# Patient Record
Sex: FEMALE | Race: WHITE | NOT HISPANIC OR LATINO | URBAN - METROPOLITAN AREA
[De-identification: names, ages, dates, MRNs, and addresses within clinical notes are randomized per-mention and may not be internally consistent; named-entity substitution may affect disease eponyms.]

---

## 2017-01-20 ENCOUNTER — IMPORTED ENCOUNTER (OUTPATIENT)
Dept: URBAN - METROPOLITAN AREA CLINIC 1 | Facility: CLINIC | Age: 57
End: 2017-01-20

## 2017-01-20 PROBLEM — H40.1131: Noted: 2017-01-20

## 2017-01-20 PROCEDURE — 99213 OFFICE O/P EST LOW 20 MIN: CPT

## 2017-01-20 PROCEDURE — 92133 CPTRZD OPH DX IMG PST SGM ON: CPT

## 2017-01-20 PROCEDURE — 92083 EXTENDED VISUAL FIELD XM: CPT

## 2017-01-20 NOTE — PATIENT DISCUSSION
1.  Mild Open Angle Glaucoma OU -Patient to start gtt regimen sample of Travatan Z QHS OU. Tmax was 22 OU. Target range 15-17 OU. IOP 19 OU. Patient advised to be compliant with gtts. Condition was discussed with patient and patient understands. Will continue to monitor patient for any progression in condition. Patient was advised to call us with any problems questions or concerns. OCT was ordered and done today showing minimal thinning OU. VF results were inferior arcuate OD and superior arcuate OS-early. 2.  Return for an appointment in 2-3 weeks for 10. with Dr. Nahid Esquivel.

## 2017-02-10 ENCOUNTER — IMPORTED ENCOUNTER (OUTPATIENT)
Dept: URBAN - METROPOLITAN AREA CLINIC 1 | Facility: CLINIC | Age: 57
End: 2017-02-10

## 2017-02-10 PROBLEM — H40.1131: Noted: 2017-02-10

## 2017-02-10 PROBLEM — H04.123: Noted: 2017-02-10

## 2017-02-10 PROCEDURE — 99213 OFFICE O/P EST LOW 20 MIN: CPT

## 2017-02-10 NOTE — PATIENT DISCUSSION
1.  Mild Open Angle Glaucoma OU-IOP was 17 OU. Patient to continue with current gtt regimen Erx Travatan Z QHS OU. Patient advised to be compliant with gtts. Condition was discussed with patient and patient understands. Will continue to monitor patient for any progression in condition. Patient was advised to call us with any problems questions or concerns. 2.  Dry Eyes OU -- Recommended to patient to use Artificial Tears BID OU3. Return for an appointment in 4 months for 10. with Dr. Tam Andujar.

## 2017-08-07 ENCOUNTER — IMPORTED ENCOUNTER (OUTPATIENT)
Dept: URBAN - METROPOLITAN AREA CLINIC 1 | Facility: CLINIC | Age: 57
End: 2017-08-07

## 2017-08-07 PROBLEM — H43.813: Noted: 2017-08-07

## 2017-08-07 PROBLEM — H04.123: Noted: 2017-08-07

## 2017-08-07 PROBLEM — H40.1131: Noted: 2017-08-07

## 2017-08-07 PROCEDURE — 99213 OFFICE O/P EST LOW 20 MIN: CPT

## 2017-08-07 NOTE — PATIENT DISCUSSION
1.  Mild Open Angle Glaucoma OU-IOP was 14 OU. Patient to continue with current gtt regimen Travatan Z QHS OU. Patient advised to be compliant with gtts. Condition was discussed with patient and patient understands. Will continue to monitor patient for any progression in condition. Patient was advised to call us with any problems questions or concerns. 2.  PVD w/o Tear OU - Patient was cautioned to call our office immediately if they experience   a substantial change in their symptoms such as an increase in floaters persistent flashes loss of visual field (may appear as a shadow or a curtain) or decrease in visual acuity as these may indicate a retinal tear or detachment. 3.  Dry Eyes OU -- Recommended to patient to use Artificial Tears BID OU4. Return for an appointment in January for 30 and OCT. with Dr. Alin Guerrero.

## 2018-01-15 ENCOUNTER — IMPORTED ENCOUNTER (OUTPATIENT)
Dept: URBAN - METROPOLITAN AREA CLINIC 1 | Facility: CLINIC | Age: 58
End: 2018-01-15

## 2018-01-15 PROBLEM — H52.13: Noted: 2018-01-15

## 2018-01-15 PROCEDURE — S0621 ROUTINE OPHTHALMOLOGICAL EXA: HCPCS

## 2018-01-15 NOTE — PATIENT DISCUSSION
1. Myopia OU- Rx for glasses given. 2. H/o Mild Open Angle Glaucoma OU (CD 0.8/0.75) Cont Latanoprost QHS OU. Pt should return for OCT next visit. 3. PVD w/o Tear OU - RD precautions. 4. Dry Eyes OU -- Cont ATs BID OU Routienly. 5. H/o DM w/o DR OU Return for an appointment in 3-4 mo 30 OCT glare with Dr. Chelsy Sterling.

## 2018-09-06 ENCOUNTER — IMPORTED ENCOUNTER (OUTPATIENT)
Dept: URBAN - METROPOLITAN AREA CLINIC 1 | Facility: CLINIC | Age: 58
End: 2018-09-06

## 2018-09-06 PROBLEM — Z79.84: Noted: 2018-09-06

## 2018-09-06 PROBLEM — H16.143: Noted: 2018-09-06

## 2018-09-06 PROBLEM — H40.1131: Noted: 2018-09-06

## 2018-09-06 PROBLEM — E11.9: Noted: 2018-09-06

## 2018-09-06 PROBLEM — H25.813: Noted: 2018-09-06

## 2018-09-06 PROBLEM — H04.123: Noted: 2018-09-06

## 2018-09-06 PROCEDURE — 92014 COMPRE OPH EXAM EST PT 1/>: CPT

## 2018-09-06 PROCEDURE — 92133 CPTRZD OPH DX IMG PST SGM ON: CPT

## 2018-09-06 NOTE — PATIENT DISCUSSION
1.  DM Type II without sign of diabetic retinopathy and no blot heme on dilated retinal examination today OU No Macular Edema:  Discussed the pathophysiology of diabetes and its effect on the eye and risk of blindness. Stressed the importance of strong glucose control. Advised of importance of at least yearly dilated examinations but to contact us immediately for any problems or concerns. 2. Type II diabetes controlled by oral medications. 3.  Mild Open Angle Glaucoma OU -(.8 OD/ .75 OS) IOP stable with partial compliance. OCT w/o signif progresssion OU. Patient to restart Latanoprost OU QHS.  (rx sent to pharm) Patient advised to be compliant with gtts. Condition was discussed with patient and patient understands. Will continue to monitor patient for any progression in condition. Patient was advised to call us with any problems questions or concerns. 4.  Cataract OU: Observe for now without intervention. The patient was advised to contact us if any change or worsening of vision5. ALDO w/ PEK OU-The continuation of artificial tears were recommended. 6.  Return for an appointment for 6mo/10 HVF with Dr. Matias Biswas.

## 2020-02-07 ENCOUNTER — IMPORTED ENCOUNTER (OUTPATIENT)
Dept: URBAN - METROPOLITAN AREA CLINIC 1 | Facility: CLINIC | Age: 60
End: 2020-02-07

## 2020-02-07 PROBLEM — H52.13: Noted: 2020-02-07

## 2020-02-07 PROCEDURE — S0621 ROUTINE OPHTHALMOLOGICAL EXA: HCPCS

## 2020-02-07 NOTE — PATIENT DISCUSSION
1. Myopia -- Rx was given for correction if indicated and requested. 2. Mild Open Angle Glaucoma OU -- (CD 0.8/0.75) **IOP elevated today secondary to noncompliance. Continue Latanoprost OU QHS w/ compliance (erx'd). **Compliance urged. ** Will continue to monitor patient for any progression in condition. Patient was advised to call us with any problems questions or concerns. 3.  Cataract OU -- Observe for now without intervention. The patient was advised to contact us if any change or worsening of vision4. ALDO w/ PEK OU -- The continuation of artificial tears were recommended. 5. H/o DM Type II (Oral Med) w/o diabetic retinopathy and No macular edema. Return for an appointment in 1-2 months for a 10/VF with Dr. Emily Nichole.

## 2022-04-03 ASSESSMENT — VISUAL ACUITY
OD_CC: J1
OS_SC: 20/20
OD_SC: 20/20
OS_CC: J1
OS_SC: 20/20
OS_CC: J1
OS_SC: 20/20-1
OD_SC: 20/25
OD_SC: 20/20
OD_SC: 20/25
OD_SC: 20/20
OS_SC: 20/20
OS_SC: 20/25
OD_SC: 20/20-1
OS_SC: 20/25
OD_CC: J1

## 2022-04-03 ASSESSMENT — TONOMETRY
OS_IOP_MMHG: 14
OD_IOP_MMHG: 23
OS_IOP_MMHG: 17
OS_IOP_MMHG: 23
OD_IOP_MMHG: 19
OS_IOP_MMHG: 17
OD_IOP_MMHG: 17
OS_IOP_MMHG: 17
OD_IOP_MMHG: 17
OD_IOP_MMHG: 14
OD_IOP_MMHG: 17
OS_IOP_MMHG: 19

## 2022-05-31 ENCOUNTER — COMPREHENSIVE EXAM (OUTPATIENT)
Dept: URBAN - METROPOLITAN AREA CLINIC 1 | Facility: CLINIC | Age: 62
End: 2022-05-31

## 2022-05-31 DIAGNOSIS — H52.13: ICD-10-CM

## 2022-05-31 PROCEDURE — 99214 OFFICE O/P EST MOD 30 MIN: CPT

## 2022-05-31 ASSESSMENT — TONOMETRY
OS_IOP_MMHG: 18
OD_IOP_MMHG: 18

## 2022-05-31 ASSESSMENT — VISUAL ACUITY
OD_BAT: 20/40
OD_CC: 20/40
OS_BAT: 20/30
OS_CC: 20/40

## 2022-05-31 NOTE — PATIENT DISCUSSION
(CD 0.8/0.75) **IOP 18 OU today secondary to noncompliance. Continue Latanoprost OU QHS w/ compliance (erx'd). **Compliance urged. ** Will continue to monitor patient for any progression in condition. Patient was advised to call us with any problems questions or concerns.